# Patient Record
Sex: FEMALE | Race: BLACK OR AFRICAN AMERICAN | NOT HISPANIC OR LATINO | Employment: FULL TIME | URBAN - METROPOLITAN AREA
[De-identification: names, ages, dates, MRNs, and addresses within clinical notes are randomized per-mention and may not be internally consistent; named-entity substitution may affect disease eponyms.]

---

## 2023-12-09 ENCOUNTER — HOSPITAL ENCOUNTER (OUTPATIENT)
Dept: RADIOLOGY | Facility: EXTERNAL LOCATION | Age: 43
Discharge: HOME | End: 2023-12-09

## 2023-12-09 DIAGNOSIS — M79.645 THUMB PAIN, LEFT: ICD-10-CM

## 2024-01-09 DIAGNOSIS — Z01.419 ENCOUNTER FOR GYNECOLOGICAL EXAMINATION (GENERAL) (ROUTINE) WITHOUT ABNORMAL FINDINGS: ICD-10-CM

## 2024-01-10 RX ORDER — NORETHINDRONE ACETATE AND ETHINYL ESTRADIOL 1; 20 MG/1; UG/1
1 TABLET ORAL DAILY
Qty: 28 TABLET | Refills: 1 | Status: SHIPPED | OUTPATIENT
Start: 2024-01-10 | End: 2024-01-31

## 2024-01-10 NOTE — TELEPHONE ENCOUNTER
Last seen 10/22 annual    Spoke to patient making her aware she is over due for annual exam.  Patient will call back to schedule annual exam.    Pallavi Hebert MA

## 2024-03-04 ENCOUNTER — OFFICE VISIT (OUTPATIENT)
Dept: OBSTETRICS AND GYNECOLOGY | Facility: CLINIC | Age: 44
End: 2024-03-04
Payer: COMMERCIAL

## 2024-03-04 VITALS
BODY MASS INDEX: 40.34 KG/M2 | SYSTOLIC BLOOD PRESSURE: 120 MMHG | DIASTOLIC BLOOD PRESSURE: 80 MMHG | WEIGHT: 251 LBS | HEIGHT: 66 IN

## 2024-03-04 DIAGNOSIS — Z01.419 WELL WOMAN EXAM WITH ROUTINE GYNECOLOGICAL EXAM: Primary | ICD-10-CM

## 2024-03-04 DIAGNOSIS — Z12.31 BREAST CANCER SCREENING BY MAMMOGRAM: ICD-10-CM

## 2024-03-04 PROCEDURE — 99396 PREV VISIT EST AGE 40-64: CPT | Performed by: OBSTETRICS & GYNECOLOGY

## 2024-03-04 PROCEDURE — 1036F TOBACCO NON-USER: CPT | Performed by: OBSTETRICS & GYNECOLOGY

## 2024-03-04 PROCEDURE — 88175 CYTOPATH C/V AUTO FLUID REDO: CPT

## 2024-03-04 PROCEDURE — 87624 HPV HI-RISK TYP POOLED RSLT: CPT

## 2024-03-04 RX ORDER — FLUTICASONE PROPIONATE 50 MCG
1 SPRAY, SUSPENSION (ML) NASAL DAILY
COMMUNITY

## 2024-03-04 NOTE — PROGRESS NOTES
Chief Complaint   Patient presents with    Annual Exam     Annual Exam with Pap Smear and Order for Mammogram      Breast Reduction 23    C/O night sweats, feeling warmer frequently, and extremely low sex drive.  Patient would like to discuss possible perimenopause, affects if enlarged pituitary gland.  Menstrual cycles are the same time each month, however, the flow and length of cycle varies.    Chaperone declined.     Patient presents for annual care.  Overall doing well.  Does wonder if decrease in libido was related to perimenopause as well as a history of a mildly enlarged pituitary gland.  Imaging of the pituitary in 2018 as well as  show a stable mildly enlarged 8 mm pituitary but no mention of adenoma.  Prolactin levels were normal.  Discussed adding I as well as referral to Dr. Coleman.  Patient will take under consideration.    Recently had breast reduction in the last year and very happy with results.  Has a area of the left breast which is still nonhealing but slowly getting better.    REVIEW OF SYSTEMS    Please see HPI for reported pertinent positives, which would supersede this ROS    Constitutional:  Denies fever, chills, wt gain or loss, fatigue    Genito-Urinary:  Denies genital lesion or sores, vaginal dryness, itching  or pain.  No abnormal vaginal discharge or unexplained vaginal bleeding.  No dysuria, urinary incontinence or frequency.  Denies pelvic pain, dysmenorrhea or dyspareunia.    Eyes:  Denies vision changes, dryness  ENT:  No hearing loss, sinus pain or congestion, nosebleeds  Cardiovascular:  No chest pain or palpitations  Respiratory:  No SOB, cough, wheezing  GI:  No Nausea, vomiting, diarrhea, constipation, abdominal pain  Musculoskeletal:  No new back pain. joint pain, peripheral edema  Skin:  No rash or skin lesion  Neurologic:  No HA, numbness or dizziness  Psychiatric:  No new anxiety or depression  Endocrine:  No loss of hair or  hirsutism  Hematologic/lymphatic:  No swollen lymph nodes.  No reported tendency for easy bruising or bleeding    Patient admits to no other systemic complaints      PHYSICAL EXAM:    PSYCH:  Pt is alert, oriented and cooperative    SKIN: warm, dry, w/o lesion    HEAD AND FACE:  External inspection of eyes, ears, functional cranial nerves normal and intact    THYROID:  No thyromegaly    CARDIOVASCULAR:  Warm and well Perfused    PULMONARY:  No respiratory distress    ABDOMEN:  soft, nontender.  No mass or organomegaly.      BREAST:  Breasts are symmetric to inspection and palpation.  No mass palpable bilaterally.  There is no axillary lymphadenopathy    PELVIC:    External genitalia, urethra, perianal region normal to inspection and palpation if indicated.  No inguinal LA    Vagina without lesions.    Cervix seen and visually normal      Bimanual exam:      No pelvic mass palpable    Uterus nontender, midline in pelvis    No adnexal masses or tenderness    Assessment/Plan    Diagnoses and all orders for this visit:  Well woman exam with routine gynecological exam  -     THINPREP PAP TEST  Breast cancer screening by mammogram  -     BI mammo bilateral screening tomosynthesis; Future

## 2024-04-09 DIAGNOSIS — Z01.419 ENCOUNTER FOR GYNECOLOGICAL EXAMINATION (GENERAL) (ROUTINE) WITHOUT ABNORMAL FINDINGS: ICD-10-CM

## 2024-04-09 RX ORDER — NORETHINDRONE ACETATE AND ETHINYL ESTRADIOL 1; 20 MG/1; UG/1
1 TABLET ORAL DAILY
Qty: 63 TABLET | Refills: 6 | Status: SHIPPED | OUTPATIENT
Start: 2024-04-09

## 2024-04-13 ENCOUNTER — LAB REQUISITION (OUTPATIENT)
Dept: LAB | Facility: HOSPITAL | Age: 44
End: 2024-04-13
Payer: COMMERCIAL

## 2024-04-13 DIAGNOSIS — L03.90 CELLULITIS, UNSPECIFIED: ICD-10-CM

## 2024-04-13 PROCEDURE — 87075 CULTR BACTERIA EXCEPT BLOOD: CPT

## 2024-04-13 PROCEDURE — 87205 SMEAR GRAM STAIN: CPT

## 2024-04-13 PROCEDURE — 87070 CULTURE OTHR SPECIMN AEROBIC: CPT

## 2024-04-16 LAB
BACTERIA SPEC CULT: NORMAL
GRAM STN SPEC: NORMAL
GRAM STN SPEC: NORMAL

## 2024-06-12 NOTE — PROGRESS NOTES
Madisyn Clark female   1980 44 y.o.   [id number]      Chief Complaint  Bilateral breast discharge    History Of Present Illness  Madisyn Clark is a very pleasant 44 y.o. AA female presenting with right breast discharge. She is established with [facility] plastics Dr. Heaton who has been managing her recurrent infections of bilateral breasts post breast reduction in 2023. The patient is no longer managed by her primary plastics physician who performed the breast reduction. Since the reduction, she has had a history of chronic wound/cellulitis of the left breast, treated with debridement and Keflex. Today, she states seroma like fluid draining from her incision sites bilaterally and often has to wear gauze.  She denies any other breast related concerns at this time.     BREAST IMAGIN2023 Bilateral screening mammogram, BI-RADS Category 1. 2024 Bilateral screening mammogram, BI-RADS Category 3,  RIGHT breast, 10:00 2 cm from the nipple, 1.2 x 1.2 x 1.3 cm, oval hypoechoic mass with indistinct margins without internal vascularity mass demonstrates areas of peripheral increased stiffness and corresponds with an area of fat necrosis on mammogramLEFT breast 1:00 subareolar 3.4 x 1.3 x 2.4 cm, irregular hypoechoic region No internal vascularity or increased peripheral vascularity is identified. Edema and tissue stiffness is noted surrounding this area of abnormality. This corresponds with the focal asymmetry on mammogram at most likely represents an area of evolving postsurgical changes with reactive lymph nodes, warranting mammogram and ultrasound in 3 months.     REPRODUCTIVE HISTORY: menarche age 13, , first birth age 35, did not breastfeed, OCP's x 20 years, premenopausal, heterogeneously dense breast tissue                                   FAMILY CANCER HISTORY:    None     Surgical History  She has a past surgical history that includes Breast surgery (2023).     Social History  She reports that  she has never smoked. She has never used smokeless tobacco. She reports current alcohol use. She reports that she does not currently use drugs after having used the following drugs: Marijuana.    Family History  Family History   Problem Relation Name Age of Onset    Arthritis Other          grandmother and aunt        Allergies  Sulfa (sulfonamide antibiotics)    Medications  Current Outpatient Medications   Medication Instructions    cetirizine (ZyrTEC) 10 mg capsule oral    fluticasone (Flonase) 50 mcg/actuation nasal spray 1 spray, Each Nostril, Daily, Shake gently. Before first use, prime pump. After use, clean tip and replace cap.    norethindrone ac-eth estradioL (Junel 1/20, 21,) 1-20 mg-mcg tablet 1 tablet, oral, Daily         REVIEW OF SYSTEMS    Constitutional:  Negative for appetite change, fatigue, fever and unexpected weight change.   HENT:  Negative for ear pain, hearing loss, nosebleeds, sore throat and trouble swallowing.    Eyes:  Negative for discharge, itching and visual disturbance.   Respiratory:  Negative for cough, chest tightness and shortness of breath.    Cardiovascular:  Negative for chest pain, palpitations and leg swelling.   Breast: as indicated in HPI  Gastrointestinal:  Negative for abdominal pain, constipation, diarrhea and nausea.   Endocrine: Negative for cold intolerance and heat intolerance.   Genitourinary:  Negative for dysuria, frequency, hematuria, pelvic pain and vaginal bleeding.   Musculoskeletal:  Negative for arthralgias, back pain, gait problem, joint swelling and myalgias.   Skin:  Negative for color change and rash.   Allergic/Immunologic: Negative for environmental allergies and food allergies.   Neurological:  Negative for dizziness, tremors, speech difficulty, weakness, numbness and headaches.   Hematological:  Does not bruise/bleed easily.   Psychiatric/Behavioral:  Negative for agitation, dysphoric mood and sleep disturbance. The patient is not nervous/anxious.          Past Medical History  She has a past medical history of Personal history of diseases of the blood and blood-forming organs and certain disorders involving the immune mechanism, Personal history of other specified conditions, and Trochanteric bursitis, right hip (04/18/2019).     Physical Exam  Patient is alert and oriented x3 and in a relaxed and appropriate mood. Her gait is steady and hand grasps are equal. Sclera is clear. The breasts are nearly symmetrical. Bilateral pedicle method reduction scars. The tissue is soft without palpable abnormalities, discrete nodules or masses. The skin and nipples appear normal- no visible drainage on exam. There is no cervical, supraclavicular or axillary lymphadenopathy. Heart rate and rhythm normal, S1 and S2 appreciated. The lungs are clear to auscultation bilaterally. Abdomen is soft and non-tender.       Physical Exam  Chest:              Last Recorded Vitals  There were no vitals filed for this visit.    Relevant Results   Time was spent viewing digital images of the radiology testing with the patient. I explained the results in depth, along with suggested explanation for follow up recommendations based on the testing results. BI-RADS Category 3    Imaging  Interpreted By:  Kasandra Salvador,   STUDY:  BI MAMMO BILATERAL DIAGNOSTIC TOMOSYNTHESIS; BI US BREAST LIMITED  BILATERAL;  6/19/2024 3:09 pm; 6/19/2024 3:44 pm      ACCESSION NUMBER(S):  [id number]; [id number]      ORDERING CLINICIAN:  GRACIELA RICK      INDICATION:  44-year-old woman with bilateral breast reduction in June 2023  complicated by chronic wound in the left breast. Patient reports  discharge from bilateral periareolar scars.      COMPARISON:  No prior breast imaging exams are available for review. Prior outside  facility mammograms have been requested.      FINDINGS:  MAMMOGRAPHY: 2D and tomosynthesis images were reviewed at 1 mm slice  thickness.      Density:  The breast tissue is  heterogeneously dense, which may  obscure small masses.      Are bilateral postsurgical changes of reduction with multiple areas  of fat necrosis. A fat containing focal asymmetry is noted in the  subareolar region of the left breast extending from the subareolar to  middle depth that persists on additional mammographic views.  No  suspicious masses or calcifications are identified in the right  breast. Multiple prominent lymph nodes are noted in the left axilla.  A prominent lymph node is questioned in the right axilla.      ULTRASOUND: Targeted ultrasound with elastography was performed by a  registered sonographer in the bilateral breast and axilla.      On scanning of the right breast, an oval hypoechoic mass with  indistinct margins without internal vascularity is identified in the  right breast at 10 o'clock, 2 cm from the nipple measuring 1.2 x 1.2  x 1.3 cm. The mass demonstrates areas of peripheral increased  stiffness and corresponds with an area of fat necrosis on mammogram.  No ductal dilatation or suspicious sonographic abnormalities are  identified in the right subareolar region. Scanning of the right  axilla demonstrates 3 morphologically normal lymph nodes with  preserved fatty hilum and normal cortical thickness.      On scanning of the left breast, an irregular hypoechoic region is  identified in the subareolar region of the left breast at 1 o'clock  measuring 3.4 x 1.3 x 2.4 cm. No definite internal fluid component is  identified to suggest a fluid collection. No internal vascularity or  increased peripheral vascularity is identified. Edema and tissue  stiffness is noted surrounding this area of abnormality. This  corresponds with the focal asymmetry on mammogram at most likely  represents an area of evolving postsurgical changes.      Scanning of the left axilla demonstrate a few lymph nodes with mild  uniform cortical thickening of to 0.3 cm, likely reactive.      IMPRESSION:  Probably benign  left breast focal asymmetry with a sonographic  correlate in the left subareolar region with a few prominent left  axillary lymph nodes most likely evolving postsurgical changes and  reactive lymph nodes. Clinical follow-up and short-term follow-up  with mammogram and ultrasound is recommended in 3 months or earlier  if clinically indicated.      Areas of benign postsurgical fat necrosis in the right breast with no  suspicious mammographic or sonographic finding. Clinical follow-up is  recommended.      Dr. Kasandra Salvador discussed the findings and recommendations with  the patient's breast surgery provider Edith Box CNP at the time  of exam.      BI-RADS CATEGORY:  BI-RADS Category:  3 Probably Benign.  Recommendation:  Short-term Interval Follow-up Imaging.  Recommended Date:  3 Months.  Laterality:  Left.       Assessment/Plan   Bilateral breast reduction 6/2023, recurrent breast infections and wound debridement, current seroma like fluid drainage from bilateral breasts.  Follow up in 2-3 months with repeat imaging of left breast. Referral placed with Dr. Humphrey for second opinion per patient request.     Patient Discussion/Summary  Your clinical examination and imaging are stable. Please return in 2-3 months for left diagnostic mammogram and ultrasound, and office visit or sooner if you have any problems or concerns.     Your breast imaging was a BI-RADS category 3. This means the findings on imaging are probably benign, but still require short term follow up to monitor stability. The chances of the findings in this category being a cancer are extremely low, less than 1-2%. You will likely need repeat follow up imaging in 6-12 month intervals for up to 2 years until the findings are known to be stable or resolve. This eliminates unnecessary biopsies and allows for early diagnosis should the area change over time.      You can see your health information, review clinical summaries from office visits & test  results online when you follow your health with MY  Chart, a personal health record. To sign up go to [email or web address]. If you need assistance with signing up or trouble getting into your account call [employer] Patient Line 24/7 at [phone].    My office phone number is [phone] if you need to get in touch with me or have additional questions or concerns. Thank you for choosing [facility] and trusting me as your healthcare provider. I look forward to seeing you again at your next office visit. I am honored to be a provider on your health care team and I remain dedicated to helping you achieve your health goals.      Edith Box, APRN-CNP

## 2024-06-19 ENCOUNTER — HOSPITAL ENCOUNTER (OUTPATIENT)
Dept: RADIOLOGY | Facility: CLINIC | Age: 44
Discharge: HOME | End: 2024-06-19
Payer: COMMERCIAL

## 2024-06-19 ENCOUNTER — OFFICE VISIT (OUTPATIENT)
Dept: SURGICAL ONCOLOGY | Facility: CLINIC | Age: 44
End: 2024-06-19
Payer: COMMERCIAL

## 2024-06-19 VITALS
SYSTOLIC BLOOD PRESSURE: 138 MMHG | DIASTOLIC BLOOD PRESSURE: 86 MMHG | WEIGHT: 252 LBS | BODY MASS INDEX: 40.69 KG/M2 | HEART RATE: 76 BPM

## 2024-06-19 VITALS — WEIGHT: 251.32 LBS | BODY MASS INDEX: 40.39 KG/M2 | HEIGHT: 66 IN

## 2024-06-19 DIAGNOSIS — Z12.31 BREAST CANCER SCREENING BY MAMMOGRAM: ICD-10-CM

## 2024-06-19 DIAGNOSIS — N63.20 MASS OF LEFT BREAST, UNSPECIFIED QUADRANT: Primary | ICD-10-CM

## 2024-06-19 PROCEDURE — 99204 OFFICE O/P NEW MOD 45 MIN: CPT

## 2024-06-19 PROCEDURE — 76982 USE 1ST TARGET LESION: CPT | Mod: 50

## 2024-06-19 PROCEDURE — 99214 OFFICE O/P EST MOD 30 MIN: CPT

## 2024-06-19 PROCEDURE — 77062 BREAST TOMOSYNTHESIS BI: CPT

## 2024-06-19 PROCEDURE — 76642 ULTRASOUND BREAST LIMITED: CPT | Mod: 50

## 2024-06-19 ASSESSMENT — PAIN SCALES - GENERAL: PAINLEVEL: 0-NO PAIN

## 2024-06-20 NOTE — PATIENT INSTRUCTIONS
Your clinical examination and imaging are stable. Please return in 2-3 months for left diagnostic mammogram and ultrasound, and office visit or sooner if you have any problems or concerns.     Your breast imaging was a BI-RADS category 3. This means the findings on imaging are probably benign, but still require short term follow up to monitor stability. The chances of the findings in this category being a cancer are extremely low, less than 1-2%. You will likely need repeat follow up imaging in 6-12 month intervals for up to 2 years until the findings are known to be stable or resolve. This eliminates unnecessary biopsies and allows for early diagnosis should the area change over time.      You can see your health information, review clinical summaries from office visits & test results online when you follow your health with MY  Chart, a personal health record. To sign up go to [email or web address]. If you need assistance with signing up or trouble getting into your account call [email or web address] Patient Line 24/7 at [phone].    My office phone number is [phone] if you need to get in touch with me or have additional questions or concerns. Thank you for choosing [facility] and trusting me as your healthcare provider. I look forward to seeing you again at your next office visit. I am honored to be a provider on your health care team and I remain dedicated to helping you achieve your health goals.

## 2024-06-21 ENCOUNTER — HOSPITAL ENCOUNTER (OUTPATIENT)
Dept: RADIOLOGY | Facility: EXTERNAL LOCATION | Age: 44
Discharge: HOME | End: 2024-06-21

## 2024-07-29 ENCOUNTER — HOSPITAL ENCOUNTER (OUTPATIENT)
Dept: RADIOLOGY | Facility: EXTERNAL LOCATION | Age: 44
Discharge: HOME | End: 2024-07-29
Payer: COMMERCIAL

## 2024-07-29 DIAGNOSIS — M79.672 PAIN IN LEFT FOOT: ICD-10-CM

## 2024-07-30 ENCOUNTER — APPOINTMENT (OUTPATIENT)
Dept: PLASTIC SURGERY | Facility: CLINIC | Age: 44
End: 2024-07-30
Payer: COMMERCIAL

## 2024-09-19 PROCEDURE — 87077 CULTURE AEROBIC IDENTIFY: CPT

## 2024-09-19 PROCEDURE — 87186 SC STD MICRODIL/AGAR DIL: CPT

## 2024-09-19 PROCEDURE — 87075 CULTR BACTERIA EXCEPT BLOOD: CPT

## 2024-09-19 PROCEDURE — 87101 SKIN FUNGI CULTURE: CPT

## 2024-09-19 PROCEDURE — 87205 SMEAR GRAM STAIN: CPT

## 2024-09-19 PROCEDURE — 87070 CULTURE OTHR SPECIMN AEROBIC: CPT

## 2024-09-23 NOTE — PROGRESS NOTES
Madisyn Clark female   1980 44 y.o.   [id number]      Chief Complaint  Abnormal imaging follow up     History Of Present Illness  Madisyn Clark is a very pleasant 44 y.o. AA female presenting with right breast discharge. She is established with [facility] plastics Dr. Heaton who has been managing her recurrent infections of bilateral breasts post breast reduction in 2023. The patient is no longer managed by her primary plastics physician who performed the breast reduction. Since the reduction, she has had a history of chronic wound/cellulitis of the left breast, treated with debridement and Keflex. Today, she denies breast discharge.        BREAST IMAGIN2023 Bilateral screening mammogram, BI-RADS Category 1. 2024 Bilateral screening mammogram, BI-RADS Category 3,  RIGHT breast, 10:00 2 cm from the nipple, 1.2 x 1.2 x 1.3 cm, oval hypoechoic mass with indistinct margins without internal vascularity mass demonstrates areas of peripheral increased stiffness and corresponds with an area of fat necrosis on mammogramLEFT breast 1:00 subareolar 3.4 x 1.3 x 2.4 cm, irregular hypoechoic region No internal vascularity or increased peripheral vascularity is identified. Edema and tissue stiffness is noted surrounding this area of abnormality. This corresponds with the focal asymmetry on mammogram at most likely represents an area of evolving postsurgical changes with reactive lymph nodes, warranting mammogram and ultrasound in 3 months. 2024 LEFT diagnostic mammogram and ultrasound, BI-RADS Category 3. Persistent collection in the subareolar region of the left breast corresponding to the mammographic focal asymmetry. Findings again favor postsurgical hematoma/seroma. Developing fat necrosis or inflammatory/infectious process is felt to be less likely. Clinical  follow-up and short-term follow-up ultrasound in 6 months is  recommended.    REPRODUCTIVE HISTORY: menarche age 13, , first birth age 35, did not  breastfeed, OCP's x 20 years, premenopausal, heterogeneously dense breast tissue                                   FAMILY CANCER HISTORY:    None     Surgical History  She has a past surgical history that includes Breast reconstruction (Bilateral, 06/14/2023).     Social History  She reports that she has never smoked. She has never used smokeless tobacco. She reports current alcohol use. She reports that she does not currently use drugs after having used the following drugs: Marijuana.    Family History  Family History   Problem Relation Name Age of Onset    Arthritis Other          grandmother and aunt        Allergies  Sulfa (sulfonamide antibiotics)    Medications  Current Outpatient Medications   Medication Instructions    cetirizine (ZyrTEC) 10 mg capsule oral    fluticasone (Flonase) 50 mcg/actuation nasal spray 1 spray, Each Nostril, Daily, Shake gently. Before first use, prime pump. After use, clean tip and replace cap.    norethindrone ac-eth estradioL (Junel 1/20, 21,) 1-20 mg-mcg tablet 1 tablet, oral, Daily         REVIEW OF SYSTEMS    Constitutional:  Negative for appetite change, fatigue, fever and unexpected weight change.   HENT:  Negative for ear pain, hearing loss, nosebleeds, sore throat and trouble swallowing.    Eyes:  Negative for discharge, itching and visual disturbance.   Respiratory:  Negative for cough, chest tightness and shortness of breath.    Cardiovascular:  Negative for chest pain, palpitations and leg swelling.   Breast: as indicated in HPI  Gastrointestinal:  Negative for abdominal pain, constipation, diarrhea and nausea.   Endocrine: Negative for cold intolerance and heat intolerance.   Genitourinary:  Negative for dysuria, frequency, hematuria, pelvic pain and vaginal bleeding.   Musculoskeletal:  Negative for arthralgias, back pain, gait problem, joint swelling and myalgias.   Skin:  Negative for color change and rash.   Allergic/Immunologic: Negative for environmental allergies  and food allergies.   Neurological:  Negative for dizziness, tremors, speech difficulty, weakness, numbness and headaches.   Hematological:  Does not bruise/bleed easily.   Psychiatric/Behavioral:  Negative for agitation, dysphoric mood and sleep disturbance. The patient is not nervous/anxious.         Past Medical History  She has a past medical history of Personal history of diseases of the blood and blood-forming organs and certain disorders involving the immune mechanism, Personal history of other specified conditions, and Trochanteric bursitis, right hip (04/18/2019).     Physical Exam  Patient is alert and oriented x3 and in a relaxed and appropriate mood. Her gait is steady and hand grasps are equal. Sclera is clear. The breasts are nearly symmetrical. Bilateral pedicle method reduction scars. The tissue is soft without palpable abnormalities, discrete nodules or masses. The skin and nipples appear normal- no visible drainage on exam. There is no cervical, supraclavicular or axillary lymphadenopathy. Heart rate and rhythm normal, S1 and S2 appreciated. The lungs are clear to auscultation bilaterally. Abdomen is soft and non-tender.       Physical Exam  Chest:            Last Recorded Vitals  Vitals:    09/25/24 1434   BP: 133/86   Pulse: 80   Temp: 36.7 °C (98.1 °F)       Relevant Results   Time was spent viewing digital images of the radiology testing with the patient. I explained the results in depth, along with suggested explanation for follow up recommendations based on the testing results. BI-RADS Category 3    Imaging  Interpreted By:  Lazaro Ortiz,   STUDY:  BI MAMMO LEFT DIAGNOSTIC TOMOSYNTHESIS; BI US BREAST LIMITED LEFT;  9/25/2024 2:00 pm; 9/25/2024 2:32 pm      ACCESSION NUMBER(S):  [id number]; [id number]      ORDERING CLINICIAN:  GRACIELA RICK      INDICATION:  The patient presents for initial short-term follow-up probably benign  left breast focal asymmetry with sonographic correlate  and prominent  left axillary lymph nodes.      ,N63.20 Unspecified lump in the left breast, unspecified quadrant      COMPARISON:  06/19/2024 and 05/18/2023      FINDINGS:  MAMMOGRAPHY: 2D and tomosynthesis images were reviewed at 1 mm slice  thickness.      Density:  The breasts are heterogeneously dense, which may obscure  small masses.      Interval improvement with persistent focal asymmetry is again seen in  the subareolar region. Prominent lymph nodes again seen in the lower  axilla.      ULTRASOUND:  Targeted ultrasound of the left breast was performed by a registered  sonographer with elastography.      At the 1 o'clock position in the subareolar region a stable  hypoechoic collection is seen measuring 3.5 x 0.9 x 1.7 cm. The  collection is avascular and intermediate on elastography. Findings  correspond to the mammographic focal asymmetry.      Scanning of the left axilla demonstrates 4 prominent morphologically  normal appearing lymph nodes.      IMPRESSION:  Persistent collection in the subareolar region of the left breast  corresponding to the mammographic focal asymmetry. Findings again  favor postsurgical hematoma/seroma. Developing fat necrosis or  inflammatory/infectious process is felt to be less likely. Clinical  follow-up and short-term follow-up ultrasound in 6 months is  recommended.      BI-RADS CATEGORY:      BI-RADS Category:  3 Probably Benign.  Recommendation:  Clinical Follow-up and Short-term Interval Follow-up  Imaging. Recommended Date:  6 Months.  Laterality:  Left.        Assessment/Plan   Bilateral breast reduction 6/2023, recurrent breast infections and wound debridement, current seroma like fluid drainage from bilateral breasts.  Follow up in 6 months with repeat imaging of left breast.     Patient Discussion/Summary  Your clinical examination and imaging are stable. Please return in 6 months for left breast ultrasound, and office visit or sooner if you have any problems or  concerns.     Your breast imaging was a BI-RADS category 3. This means the findings on imaging are probably benign, but still require short term follow up to monitor stability. The chances of the findings in this category being a cancer are extremely low, less than 1-2%. You will likely need repeat follow up imaging in 6-12 month intervals for up to 2 years until the findings are known to be stable or resolve. This eliminates unnecessary biopsies and allows for early diagnosis should the area change over time.      You can see your health information, review clinical summaries from office visits & test results online when you follow your health with MY  Chart, a personal health record. To sign up go to [email or web address]. If you need assistance with signing up or trouble getting into your account call appAttach Patient Line 24/7 at [phone].    My office phone number is [phone] if you need to get in touch with me or have additional questions or concerns. Thank you for choosing [facility] and trusting me as your healthcare provider. I look forward to seeing you again at your next office visit. I am honored to be a provider on your health care team and I remain dedicated to helping you achieve your health goals.      Edith Box, APRN-CNP

## 2024-09-24 ENCOUNTER — LAB REQUISITION (OUTPATIENT)
Dept: LAB | Facility: HOSPITAL | Age: 44
End: 2024-09-24
Payer: COMMERCIAL

## 2024-09-24 DIAGNOSIS — D48.5 NEOPLASM OF UNCERTAIN BEHAVIOR OF SKIN: ICD-10-CM

## 2024-09-24 DIAGNOSIS — L98.9 DISORDER OF THE SKIN AND SUBCUTANEOUS TISSUE, UNSPECIFIED: ICD-10-CM

## 2024-09-24 DIAGNOSIS — L30.9 DERMATITIS, UNSPECIFIED: ICD-10-CM

## 2024-09-24 DIAGNOSIS — L21.8 OTHER SEBORRHEIC DERMATITIS: ICD-10-CM

## 2024-09-25 ENCOUNTER — OFFICE VISIT (OUTPATIENT)
Dept: SURGICAL ONCOLOGY | Facility: CLINIC | Age: 44
End: 2024-09-25
Payer: COMMERCIAL

## 2024-09-25 ENCOUNTER — HOSPITAL ENCOUNTER (OUTPATIENT)
Dept: RADIOLOGY | Facility: CLINIC | Age: 44
Discharge: HOME | End: 2024-09-25
Payer: COMMERCIAL

## 2024-09-25 VITALS
DIASTOLIC BLOOD PRESSURE: 86 MMHG | WEIGHT: 245 LBS | HEART RATE: 80 BPM | TEMPERATURE: 98.1 F | SYSTOLIC BLOOD PRESSURE: 133 MMHG | BODY MASS INDEX: 39.56 KG/M2

## 2024-09-25 DIAGNOSIS — N63.20 MASS OF LEFT BREAST, UNSPECIFIED QUADRANT: ICD-10-CM

## 2024-09-25 PROCEDURE — 77061 BREAST TOMOSYNTHESIS UNI: CPT | Mod: LT

## 2024-09-25 PROCEDURE — 76642 ULTRASOUND BREAST LIMITED: CPT | Mod: LT

## 2024-09-25 PROCEDURE — 1036F TOBACCO NON-USER: CPT

## 2024-09-25 PROCEDURE — 76642 ULTRASOUND BREAST LIMITED: CPT | Mod: LEFT SIDE | Performed by: STUDENT IN AN ORGANIZED HEALTH CARE EDUCATION/TRAINING PROGRAM

## 2024-09-25 PROCEDURE — 99214 OFFICE O/P EST MOD 30 MIN: CPT

## 2024-09-25 PROCEDURE — 77065 DX MAMMO INCL CAD UNI: CPT | Mod: LEFT SIDE | Performed by: STUDENT IN AN ORGANIZED HEALTH CARE EDUCATION/TRAINING PROGRAM

## 2024-09-25 PROCEDURE — 76982 USE 1ST TARGET LESION: CPT | Mod: LT

## 2024-09-25 PROCEDURE — 77061 BREAST TOMOSYNTHESIS UNI: CPT | Mod: LEFT SIDE | Performed by: STUDENT IN AN ORGANIZED HEALTH CARE EDUCATION/TRAINING PROGRAM

## 2024-09-25 ASSESSMENT — PATIENT HEALTH QUESTIONNAIRE - PHQ9
1. LITTLE INTEREST OR PLEASURE IN DOING THINGS: NOT AT ALL
2. FEELING DOWN, DEPRESSED OR HOPELESS: NOT AT ALL
SUM OF ALL RESPONSES TO PHQ9 QUESTIONS 1 & 2: 0

## 2024-09-25 ASSESSMENT — PAIN SCALES - GENERAL: PAINLEVEL: 0-NO PAIN

## 2024-09-25 NOTE — PATIENT INSTRUCTIONS
Your clinical examination and imaging are stable. Please return in 6 months for left breast ultrasound, and office visit or sooner if you have any problems or concerns.     Your breast imaging was a BI-RADS category 3. This means the findings on imaging are probably benign, but still require short term follow up to monitor stability. The chances of the findings in this category being a cancer are extremely low, less than 1-2%. You will likely need repeat follow up imaging in 6-12 month intervals for up to 2 years until the findings are known to be stable or resolve. This eliminates unnecessary biopsies and allows for early diagnosis should the area change over time.      You can see your health information, review clinical summaries from office visits & test results online when you follow your health with MY  Chart, a personal health record. To sign up go to [email or web address]. If you need assistance with signing up or trouble getting into your account call [employer] Patient Line 24/7 at [phone].    My office phone number is [phone] if you need to get in touch with me or have additional questions or concerns. Thank you for choosing [facility] and trusting me as your healthcare provider. I look forward to seeing you again at your next office visit. I am honored to be a provider on your health care team and I remain dedicated to helping you achieve your health goals.

## 2024-09-26 LAB
BACTERIA SPEC CULT: ABNORMAL
BACTERIA SPEC CULT: ABNORMAL
GRAM STN SPEC: ABNORMAL

## 2024-10-08 LAB — FUNGUS SPEC CULT: NORMAL

## 2024-10-09 ENCOUNTER — OFFICE VISIT (OUTPATIENT)
Dept: URGENT CARE | Age: 44
End: 2024-10-09
Payer: COMMERCIAL

## 2024-10-09 VITALS
RESPIRATION RATE: 16 BRPM | BODY MASS INDEX: 38.57 KG/M2 | HEART RATE: 90 BPM | WEIGHT: 240 LBS | SYSTOLIC BLOOD PRESSURE: 119 MMHG | DIASTOLIC BLOOD PRESSURE: 84 MMHG | HEIGHT: 66 IN | TEMPERATURE: 98.4 F | OXYGEN SATURATION: 97 %

## 2024-10-09 DIAGNOSIS — R42 VERTIGO: Primary | ICD-10-CM

## 2024-10-09 PROCEDURE — 3008F BODY MASS INDEX DOCD: CPT | Performed by: NURSE PRACTITIONER

## 2024-10-09 PROCEDURE — 99213 OFFICE O/P EST LOW 20 MIN: CPT | Performed by: NURSE PRACTITIONER

## 2024-10-09 RX ORDER — MECLIZINE HCL 12.5 MG 12.5 MG/1
12.5 TABLET ORAL 3 TIMES DAILY PRN
Qty: 30 TABLET | Refills: 0 | Status: SHIPPED | OUTPATIENT
Start: 2024-10-09 | End: 2025-10-09

## 2024-10-09 NOTE — PROGRESS NOTES
Subjective   Patient ID: Madisyn Clark is a 44 y.o. female. They present today with a chief complaint of Ear Problem.    History of Present Illness  HPI  Patient is a 44-year-old female who presents with ear on the right.  She states she has had issues with her ear in the past.  She is also had a little bit of dizziness but does suffer from vertigo.  She does not have any more for vertigo medication at home.  She wants to make seeif she has an infection or wax in her ear.  Past Medical History  Allergies as of 10/09/2024 - Reviewed 10/09/2024   Allergen Reaction Noted    Sulfa (sulfonamide antibiotics) Itching 08/21/2023       (Not in a hospital admission)       Past Medical History:   Diagnosis Date    Personal history of diseases of the blood and blood-forming organs and certain disorders involving the immune mechanism     History of anemia    Personal history of other specified conditions     History of vertigo    Trochanteric bursitis, right hip 04/18/2019    Trochanteric bursitis of both hips       Past Surgical History:   Procedure Laterality Date    BREAST RECONSTRUCTION Bilateral 06/14/2023    breast reduction        reports that she has never smoked. She has never used smokeless tobacco. She reports current alcohol use. She reports that she does not currently use drugs after having used the following drugs: Marijuana.    Review of Systems  Review of Systems  Gen: No fatigue, fever, sweats.  Head: No headache, trauma.  Eyes: No vision loss, double vision, drainage, eye pain.  ENT: + hearing changes, pain, epistaxis, congestion  Cardiac: No chest pain  Pulmonary: No shortness of breath,  pleuritic pain,   Heme/lymph: No swollen glands  GI: No abdominal pain, nausea, vomiting, diarrhea  : No  dysuria, frequency, urgency, hematuria  Musculoskeletal: No limb pain, joint pain, back pain, joint swelling or stiffness.  Skin: No rashes, pruritus, lumps, lesions.  Neuro: No Numbness, tingling, or weakness.  Psych:  "No  anxiety     Review of systems is otherwise negative unless stated above or in history of present illness.                             Objective    Vitals:    10/09/24 1613   BP: 119/84   Pulse: 90   Resp: 16   Temp: 36.9 °C (98.4 °F)   SpO2: 97%   Weight: 109 kg (240 lb)   Height: 1.676 m (5' 6\")     No LMP recorded.    Physical Exam  General: Vital signs stable, Pt is alert, no acute distress  Eyes: Conjunctiva normal, PERRL, EOMs intact  HENMT: Normocephalic, atraumatic, external ears and nose normal, no scars or masses.  No mastoid tenderness. Trachea is midline. No meningeal signs, negative Kernig and Brudzinski, moves neck freely.  No sinus tenderness  Resp: Respiratory effort is normal, no retractions, no stridor. Lungs CTA, no wheezes or rhonchi  CV: Heart is regular rate and rhythm.   Skin: No evidence of trauma, skin is warm and dry. No rashes, lesions or ulcers.  Skel: full range of motion of upper and lower extremities.   Neuro: Normal gait, CN II-XII intact, no motor or sensory changes.  Psych: Alert and oriented ×3, judgment is appropriate, normal mood and affect   Procedures    Point of Care Test & Imaging Results from this visit  No results found for this visit on 10/09/24.   No results found.    Diagnostic study results (if any) were reviewed by KATELYN Reyes.    Assessment/Plan   Allergies, medications, history, and pertinent labs/EKGs/Imaging reviewed by KATELYN Reyes.     Medical Decision Making  History and physical is consistent with fluid in the inner ear.  No signs of cerumen, otitis media, otitis externa, sinusitis.  Patient be treated with vertigo medication meclizine and follow-up with her primary care doctor.    Orders and Diagnoses  There are no diagnoses linked to this encounter.    Medical Admin Record      Patient disposition: Home    Electronically signed by KATELYN Reyes  4:17 PM      "

## 2024-10-09 NOTE — PATIENT INSTRUCTIONS
Ear does not look infected.  No wax in ear canal.  You may have some fluid behind the ear drum in the inner ear.  Continue with your antihistamine and take meclizine and see if that clears it up

## 2024-10-21 LAB — FUNGUS SPEC CULT: NORMAL

## 2025-04-02 ENCOUNTER — APPOINTMENT (OUTPATIENT)
Dept: SURGICAL ONCOLOGY | Facility: CLINIC | Age: 45
End: 2025-04-02
Payer: COMMERCIAL

## 2025-04-02 ENCOUNTER — APPOINTMENT (OUTPATIENT)
Dept: RADIOLOGY | Facility: CLINIC | Age: 45
End: 2025-04-02
Payer: COMMERCIAL

## 2025-05-14 DIAGNOSIS — Z01.419 ENCOUNTER FOR GYNECOLOGICAL EXAMINATION (GENERAL) (ROUTINE) WITHOUT ABNORMAL FINDINGS: ICD-10-CM

## 2025-05-14 RX ORDER — NORETHINDRONE ACETATE AND ETHINYL ESTRADIOL 20; 1 UG/1; MG/1
1 TABLET ORAL DAILY
Qty: 63 TABLET | Refills: 1 | Status: SHIPPED | OUTPATIENT
Start: 2025-05-14

## 2025-06-23 ENCOUNTER — APPOINTMENT (OUTPATIENT)
Dept: RADIOLOGY | Facility: CLINIC | Age: 45
End: 2025-06-23
Payer: COMMERCIAL

## 2025-07-15 ENCOUNTER — APPOINTMENT (OUTPATIENT)
Dept: RADIOLOGY | Facility: CLINIC | Age: 45
End: 2025-07-15
Payer: COMMERCIAL

## 2025-07-29 ENCOUNTER — APPOINTMENT (OUTPATIENT)
Dept: RADIOLOGY | Facility: CLINIC | Age: 45
End: 2025-07-29
Payer: COMMERCIAL

## 2025-08-13 ENCOUNTER — APPOINTMENT (OUTPATIENT)
Dept: RADIOLOGY | Facility: CLINIC | Age: 45
End: 2025-08-13
Payer: COMMERCIAL

## 2025-08-26 ENCOUNTER — APPOINTMENT (OUTPATIENT)
Dept: RADIOLOGY | Facility: CLINIC | Age: 45
End: 2025-08-26
Payer: COMMERCIAL

## 2025-09-09 ENCOUNTER — APPOINTMENT (OUTPATIENT)
Dept: RADIOLOGY | Facility: CLINIC | Age: 45
End: 2025-09-09
Payer: COMMERCIAL